# Patient Record
Sex: MALE | URBAN - METROPOLITAN AREA
[De-identification: names, ages, dates, MRNs, and addresses within clinical notes are randomized per-mention and may not be internally consistent; named-entity substitution may affect disease eponyms.]

---

## 2022-09-21 ENCOUNTER — OFFICE VISIT (OUTPATIENT)
Dept: URBAN - METROPOLITAN AREA CLINIC 7 | Facility: CLINIC | Age: 47
End: 2022-09-21
Payer: COMMERCIAL

## 2022-09-21 DIAGNOSIS — H35.713 CENTRAL SEROUS CHORIORETINOPATHY, BILATERAL: Primary | ICD-10-CM

## 2022-09-21 DIAGNOSIS — H52.203 UNSPECIFIED ASTIGMATISM, BILATERAL: ICD-10-CM

## 2022-09-21 PROCEDURE — 92134 CPTRZ OPH DX IMG PST SGM RTA: CPT | Performed by: OPHTHALMOLOGY

## 2022-09-21 PROCEDURE — 99204 OFFICE O/P NEW MOD 45 MIN: CPT | Performed by: OPHTHALMOLOGY

## 2022-09-21 ASSESSMENT — INTRAOCULAR PRESSURE
OS: 19
OD: 19

## 2022-09-21 NOTE — IMPRESSION/PLAN
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 Plan: Patient referred for macular evaluation OS. Exam and OCT demonstrate thick choroid with serous PED OS. The findings are consistent with inactive . The diagnosis, natural history, and prognosis of  were re-emphasized with the patient. The risks, benefits, and alternatives of various treatment options, including photodynamic therapy, thermal laser, and observation were discussed. Given lack of SRF/activity, observation is recommended at this time. The patient was instructed to avoid stress and exogenous corticosteroids if possible. 

RTC RCA PRN

## 2022-09-21 NOTE — IMPRESSION/PLAN
Impression: Unspecified astigmatism, bilateral: H52.203. Plan: Patient notes issues with reading. He follows with Dr. Mendy Montiel. [id number] [address]govern for [facility].